# Patient Record
Sex: FEMALE
[De-identification: names, ages, dates, MRNs, and addresses within clinical notes are randomized per-mention and may not be internally consistent; named-entity substitution may affect disease eponyms.]

---

## 2017-09-29 ENCOUNTER — APPOINTMENT (OUTPATIENT)
Dept: ORTHOPEDIC SURGERY | Facility: CLINIC | Age: 42
End: 2017-09-29
Payer: COMMERCIAL

## 2017-09-29 VITALS — WEIGHT: 180 LBS | HEIGHT: 66 IN | BODY MASS INDEX: 28.93 KG/M2

## 2017-09-29 DIAGNOSIS — M54.16 RADICULOPATHY, LUMBAR REGION: ICD-10-CM

## 2017-09-29 DIAGNOSIS — Q76.2 CONGENITAL SPONDYLOLISTHESIS: ICD-10-CM

## 2017-09-29 PROCEDURE — 99203 OFFICE O/P NEW LOW 30 MIN: CPT

## 2021-03-24 ENCOUNTER — LABORATORY RESULT (OUTPATIENT)
Age: 46
End: 2021-03-24

## 2021-03-24 ENCOUNTER — APPOINTMENT (OUTPATIENT)
Dept: UROLOGY | Facility: CLINIC | Age: 46
End: 2021-03-24
Payer: MEDICARE

## 2021-03-24 VITALS
BODY MASS INDEX: 28.61 KG/M2 | TEMPERATURE: 98 F | WEIGHT: 178 LBS | HEART RATE: 68 BPM | DIASTOLIC BLOOD PRESSURE: 82 MMHG | HEIGHT: 66 IN | SYSTOLIC BLOOD PRESSURE: 119 MMHG

## 2021-03-24 DIAGNOSIS — Z86.39 PERSONAL HISTORY OF OTHER ENDOCRINE, NUTRITIONAL AND METABOLIC DISEASE: ICD-10-CM

## 2021-03-24 DIAGNOSIS — Z86.79 PERSONAL HISTORY OF OTHER DISEASES OF THE CIRCULATORY SYSTEM: ICD-10-CM

## 2021-03-24 DIAGNOSIS — Z80.0 FAMILY HISTORY OF MALIGNANT NEOPLASM OF DIGESTIVE ORGANS: ICD-10-CM

## 2021-03-24 DIAGNOSIS — Z78.9 OTHER SPECIFIED HEALTH STATUS: ICD-10-CM

## 2021-03-24 PROCEDURE — 99204 OFFICE O/P NEW MOD 45 MIN: CPT

## 2021-03-24 RX ORDER — ENALAPRIL MALEATE 10 MG/1
10 TABLET ORAL
Refills: 0 | Status: ACTIVE | COMMUNITY

## 2021-03-24 RX ORDER — ATORVASTATIN CALCIUM 20 MG/1
20 TABLET, FILM COATED ORAL
Refills: 0 | Status: ACTIVE | COMMUNITY

## 2021-03-24 RX ORDER — ATENOLOL 25 MG/1
25 TABLET ORAL
Refills: 0 | Status: ACTIVE | COMMUNITY

## 2021-03-24 RX ORDER — PSYLLIUM HUSK 0.4 G
CAPSULE ORAL
Refills: 0 | Status: ACTIVE | COMMUNITY

## 2021-03-24 RX ORDER — SERTRALINE HYDROCHLORIDE 50 MG/1
50 TABLET, FILM COATED ORAL
Refills: 0 | Status: ACTIVE | COMMUNITY

## 2021-03-24 RX ORDER — MELATONIN 3 MG
TABLET ORAL
Refills: 0 | Status: ACTIVE | COMMUNITY

## 2021-03-24 RX ORDER — ARIPIPRAZOLE 10 MG/1
10 TABLET ORAL
Refills: 0 | Status: ACTIVE | COMMUNITY

## 2021-03-24 NOTE — HISTORY OF PRESENT ILLNESS
[FreeTextEntry1] : Ms. Ellison is a 45yof with PMH significant for intellectual disability and back surgery who presents for evaluation of nocturia.  For the past few years she has had ~1 episode of incontinence per night.  They have managed via a diaper thus far.  She additonally wakes 1-2 times per night in order to void.  Her symptoms have been static over that time.  No episodes during the day.  Has some urgency during the day, but no urgency incontinence.  Sister reports that her symptoms have roughly coincided with a decrease in their mothers health and is concerned it may be a psychological response to it. They have already limited PO intake past dinnertime and timed voiding before bed.  Patient is currently experiencing no nausea and no anorexia no urinary retention, no urinary urgency, no urinary frequency, no nocturia, no straining, no weak stream, no dysuria, no gross hematuria, no bladder spasm, no abdominal pain, no flank pain, no fever and no fatigue.  Previously has seen another urologist who [performed UDS, which showed no abnormalities.  Sister cannot remember urologist's name.  Discussed anticholinergics at that time but held off due to side effect profile.  Sister additionally notes decreased mobility and weight gain since COVID started.\par

## 2021-04-09 RX ORDER — AMOXICILLIN AND CLAVULANATE POTASSIUM 875; 125 MG/1; MG/1
875-125 TABLET, COATED ORAL TWICE DAILY
Qty: 10 | Refills: 0 | Status: ACTIVE | COMMUNITY
Start: 2021-04-09 | End: 1900-01-01

## 2021-04-16 ENCOUNTER — APPOINTMENT (OUTPATIENT)
Dept: UROLOGY | Facility: CLINIC | Age: 46
End: 2021-04-16

## 2021-05-18 ENCOUNTER — APPOINTMENT (OUTPATIENT)
Dept: UROLOGY | Facility: CLINIC | Age: 46
End: 2021-05-18

## 2021-05-18 ENCOUNTER — APPOINTMENT (OUTPATIENT)
Dept: UROLOGY | Facility: CLINIC | Age: 46
End: 2021-05-18
Payer: MEDICARE

## 2021-05-18 DIAGNOSIS — R32 UNSPECIFIED URINARY INCONTINENCE: ICD-10-CM

## 2021-05-18 DIAGNOSIS — N39.0 URINARY TRACT INFECTION, SITE NOT SPECIFIED: ICD-10-CM

## 2021-05-18 DIAGNOSIS — R35.1 NOCTURIA: ICD-10-CM

## 2021-05-18 PROCEDURE — 76775 US EXAM ABDO BACK WALL LIM: CPT

## 2021-05-18 PROCEDURE — 99214 OFFICE O/P EST MOD 30 MIN: CPT | Mod: 25

## 2021-05-18 NOTE — ASSESSMENT
[FreeTextEntry1] : Very pleasant 45yof with nocturia, urinary incontinence, nocturnal enuresis\par - we had an extensive discussion regarding potential etiologies of nocturia and urinary incontinence, especially nocturnal enuresis\par - urine culture reviewed- E. coli treated with Augmentin\par - Urinalysis reviewed- moderate bacteria, negative nitrites and LE, 2 RBC/hpf\par - Renal/bladder US images reviewed demonstrating no abnormalities\par -Discussed option to start an anticholinergic or mirabegron at this time, however she would like to defer at this time\par -F/U in 6 months or seeoner if necessary\par

## 2021-05-18 NOTE — HISTORY OF PRESENT ILLNESS
[FreeTextEntry1] :  Very pleasant 45yof with PMH significant for intellectual disability and back surgery who presents for follow up of nocturia and nocturnal enuresis. For the past few years she has had ~1 episode of incontinence per night managed via a diaper thus far. She additionally awakes 1-2 times per night in order to void. Her symptoms have been static over that time. No episodes during the day. Has some urgency during the day, but no urgency incontinence. Sister reports that her symptoms have roughly coincided with a decrease in their mothers health and is concerned it may be a psychological response to it. They have already limited PO intake past dinnertime and timed voiding before bed. \par \par Previously had seen another urologist who performed UDS, which showed no abnormalities. Discussed anticholinergics at that time but held off due to side effect profile. Sister additionally notes decreased mobility and weight gain since COVID started. No other complaints. US today demonstrates no abnormalities.\par

## 2021-05-18 NOTE — PHYSICAL EXAM
[General Appearance - Well Developed] : well developed [General Appearance - Well Nourished] : well nourished [Normal Appearance] : normal appearance [Well Groomed] : well groomed [Abdomen Soft] : soft [General Appearance - In No Acute Distress] : no acute distress [Abdomen Tenderness] : non-tender [Costovertebral Angle Tenderness] : no ~M costovertebral angle tenderness [Urinary Bladder Findings] : the bladder was normal on palpation [Edema] : no peripheral edema [] : no respiratory distress [Respiration, Rhythm And Depth] : normal respiratory rhythm and effort [Exaggerated Use Of Accessory Muscles For Inspiration] : no accessory muscle use [Oriented To Time, Place, And Person] : oriented to person, place, and time [Affect] : the affect was normal [Mood] : the mood was normal [Not Anxious] : not anxious [Normal Station and Gait] : the gait and station were normal for the patient's age [No Palpable Adenopathy] : no palpable adenopathy [No Focal Deficits] : no focal deficits

## 2023-07-05 ENCOUNTER — EMERGENCY (EMERGENCY)
Facility: HOSPITAL | Age: 48
LOS: 1 days | Discharge: NURSING, REHAB OR LONG-TERM CARE | End: 2023-07-05
Attending: EMERGENCY MEDICINE | Admitting: EMERGENCY MEDICINE
Payer: MEDICARE

## 2023-07-05 VITALS
RESPIRATION RATE: 18 BRPM | DIASTOLIC BLOOD PRESSURE: 79 MMHG | HEART RATE: 59 BPM | OXYGEN SATURATION: 100 % | SYSTOLIC BLOOD PRESSURE: 132 MMHG | TEMPERATURE: 98 F

## 2023-07-05 VITALS
OXYGEN SATURATION: 100 % | RESPIRATION RATE: 17 BRPM | TEMPERATURE: 98 F | HEART RATE: 69 BPM | SYSTOLIC BLOOD PRESSURE: 121 MMHG | DIASTOLIC BLOOD PRESSURE: 78 MMHG
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
## 2023-07-05 PROCEDURE — 93971 EXTREMITY STUDY: CPT | Mod: 26,LT

## 2023-07-05 PROCEDURE — 73590 X-RAY EXAM OF LOWER LEG: CPT | Mod: 26,LT

## 2023-07-05 PROCEDURE — 99284 EMERGENCY DEPT VISIT MOD MDM: CPT

## 2023-07-05 PROCEDURE — 73110 X-RAY EXAM OF WRIST: CPT | Mod: 26,LT

## 2023-07-05 NOTE — ED PROVIDER NOTE - PATIENT PORTAL LINK FT
You can access the FollowMyHealth Patient Portal offered by [facility] by registering at the following website: [email or web address]. By joining [employer]’s FollowMyHealth portal, you will also be able to view your health information using other applications (apps) compatible with our system. You can access the FollowMyHealth Patient Portal offered by [facility] by registering at the following website: [email or web address]. By joining [employer]’s FollowMyHealth portal, you will also be able to view your health information using other applications (apps) compatible with our system. You can access the FollowMyHealth Patient Portal offered by [facility] by registering at the following website: [email or web address]. By joining [employer]’s FollowMyHealth portal, you will also be able to view your health information using other applications (apps) compatible with our system.

## 2023-07-05 NOTE — ED PROVIDER NOTE - NSFOLLOWUPINSTRUCTIONS_ED_ALL_ED_FT
To decrease swelling, elevate left leg when possible.    Over-the-counter Tylenol 500 mg (1 or 2 every 6 hours) for pain control.

## 2023-07-05 NOTE — ED PROVIDER NOTE - OBJECTIVE STATEMENT
Roderick: Intellectual disability. 2 wks ago, mechanical fall. Injured L leg. Sent from urgent care for x-ray and US (? DVT). Minimal pain. No F/redness. Also c/o pain L wrist 2/2 fall.

## 2023-07-05 NOTE — ED PROVIDER NOTE - CLINICAL SUMMARY MEDICAL DECISION MAKING FREE TEXT BOX
Roderick: Unlikely fx or DVT. But, given fall, intellectual disability, and group home arrangement, will get x-ray and US.

## 2023-07-05 NOTE — ED PROVIDER NOTE - CARE PLAN
Principal Discharge DX:	Left leg swelling   1 Principal Discharge DX:	Left leg swelling  Secondary Diagnosis:	Left wrist pain

## 2023-07-05 NOTE — ED ADULT TRIAGE NOTE - CHIEF COMPLAINT QUOTE
Pt sent from urgent care for eval of her left lower leg, DVT vs fracture. Pt had a fall 2 weeks ago, injuring her left leg. Noted with bruising to left shin and LLE swelling. Ambulatory in triage. From group home with staff, per staff, no xray was done at urgent care.

## 2023-07-05 NOTE — ED PROVIDER NOTE - PHYSICAL EXAMINATION
Well-appearing, well nourished, awake, alert, oriented to person, place, time/situation and in no apparent distress.    Airway patent. Neck supple.    Eyes without scleral injection. No jaundice.    Strong pulse.    Respirations unlabored.    Abdomen soft, non-tender, no guarding.    MSK: L lower leg mildly-swollen. Bruised. TTP mild in mid-tib-fib area. Bruising extends to ankle and foot dosum (likely 2/2 gravity, as not tender at ankle or foot). L wrist TTP w/o swelling. No redness/warmth.    Alert and oriented, no gross motor or sensory deficits.    Skin: normal color for race, warm, dry and intact. No evidence of rash.    No SI/HI.